# Patient Record
Sex: MALE | Race: WHITE | Employment: FULL TIME | URBAN - METROPOLITAN AREA
[De-identification: names, ages, dates, MRNs, and addresses within clinical notes are randomized per-mention and may not be internally consistent; named-entity substitution may affect disease eponyms.]

---

## 2020-08-14 ENCOUNTER — HOSPITAL ENCOUNTER (OUTPATIENT)
Dept: OPERATING ROOM | Age: 60
Setting detail: OUTPATIENT SURGERY
Discharge: HOME | End: 2020-08-14
Payer: COMMERCIAL

## 2020-08-14 VITALS — SYSTOLIC BLOOD PRESSURE: 154 MMHG | HEART RATE: 61 BPM | DIASTOLIC BLOOD PRESSURE: 96 MMHG

## 2020-08-14 PROCEDURE — 6370000000 HC RX 637 (ALT 250 FOR IP): Performed by: OPHTHALMOLOGY

## 2020-08-14 PROCEDURE — 2500000003 HC RX 250 WO HCPCS: Performed by: OPHTHALMOLOGY

## 2020-08-14 PROCEDURE — 65855 TRABECULOPLASTY LASER SURG: CPT

## 2020-08-14 RX ORDER — PROPARACAINE HYDROCHLORIDE 5 MG/ML
1 SOLUTION/ DROPS OPHTHALMIC ONCE
Status: COMPLETED | OUTPATIENT
Start: 2020-08-14 | End: 2020-08-14

## 2020-08-14 RX ORDER — PILOCARPINE HYDROCHLORIDE 20 MG/ML
1 SOLUTION/ DROPS OPHTHALMIC ONCE
Status: COMPLETED | OUTPATIENT
Start: 2020-08-14 | End: 2020-08-14

## 2020-08-14 RX ADMIN — BROMFENAC SODIUM 1 DROP: 0.7 SOLUTION/ DROPS OPHTHALMIC at 14:18

## 2020-08-14 RX ADMIN — PILOCARPINE HYDROCHLORIDE 1 DROP: 20 SOLUTION/ DROPS OPHTHALMIC at 13:03

## 2020-08-14 RX ADMIN — APRACLONIDINE HYDROCHLORIDE 1 DROP: 10 SOLUTION/ DROPS OPHTHALMIC at 14:19

## 2020-08-14 RX ADMIN — APRACLONIDINE HYDROCHLORIDE 1 DROP: 10 SOLUTION/ DROPS OPHTHALMIC at 13:03

## 2020-08-14 RX ADMIN — PROPARACAINE HYDROCHLORIDE 1 DROP: 5 SOLUTION/ DROPS OPHTHALMIC at 14:13

## 2020-08-17 NOTE — OP NOTE
OPERATIVE NOTE      Surgery Date:   08/14/2020       Pre-operative Diagnosis:   Primary Open Angle Glaucoma    Procedure:   Selective LASER Trabeculoplasty right eye    Anesthesia:  Topical    Complications:  None    The patient tolerated the procedure well and will follow up as an outpatient in my office as scheduled. Jimi Richard M.D.     Operative Note      Patient: Katelyn Lieberman  YOB: 1960  MRN: [id number]    Date of Procedure: 8/14/2020    Pre-Op Diagnosis: * No pre-op diagnosis entered *    Post-Op Diagnosis: Same       * No procedures listed *    * No surgeons listed *    Assistant:   * No surgical staff found *    Anesthesia: * No anesthesia type entered *    Estimated Blood Loss (mL): less than 50     Complications: None    Specimens:   * No specimens in log *    Implants:  * No implants in log *      Drains: * No LDAs found *    Findings: na    Detailed Description of Procedure:   Selective laser trabeculoplasty right eye    Electronically signed by Maryellen Fairbanks MD on 8/17/2020 at 12:28 PM

## 2020-08-21 ENCOUNTER — HOSPITAL ENCOUNTER (OUTPATIENT)
Dept: OPERATING ROOM | Age: 60
Setting detail: OUTPATIENT SURGERY
Discharge: HOME | End: 2020-08-21
Payer: COMMERCIAL

## 2020-08-21 VITALS — SYSTOLIC BLOOD PRESSURE: 150 MMHG | DIASTOLIC BLOOD PRESSURE: 99 MMHG

## 2020-08-21 PROCEDURE — 66821 AFTER CATARACT LASER SURGERY: CPT

## 2020-08-21 PROCEDURE — 65855 TRABECULOPLASTY LASER SURG: CPT

## 2020-08-21 PROCEDURE — 6370000000 HC RX 637 (ALT 250 FOR IP): Performed by: OPHTHALMOLOGY

## 2020-08-21 PROCEDURE — 2500000003 HC RX 250 WO HCPCS: Performed by: OPHTHALMOLOGY

## 2020-08-21 RX ORDER — PROPARACAINE HYDROCHLORIDE 5 MG/ML
1 SOLUTION/ DROPS OPHTHALMIC ONCE
Status: COMPLETED | OUTPATIENT
Start: 2020-08-21 | End: 2020-08-21

## 2020-08-21 RX ORDER — PILOCARPINE HYDROCHLORIDE 20 MG/ML
1 SOLUTION/ DROPS OPHTHALMIC ONCE
Status: COMPLETED | OUTPATIENT
Start: 2020-08-21 | End: 2020-08-21

## 2020-08-21 RX ADMIN — APRACLONIDINE HYDROCHLORIDE 1 DROP: 10 SOLUTION/ DROPS OPHTHALMIC at 13:05

## 2020-08-21 RX ADMIN — PILOCARPINE HYDROCHLORIDE 1 DROP: 20 SOLUTION/ DROPS OPHTHALMIC at 13:04

## 2020-08-21 RX ADMIN — PROPARACAINE HYDROCHLORIDE 1 DROP: 5 SOLUTION/ DROPS OPHTHALMIC at 13:52

## 2020-08-21 RX ADMIN — BROMFENAC SODIUM 1 DROP: 0.7 SOLUTION/ DROPS OPHTHALMIC at 13:55

## 2020-08-21 RX ADMIN — APRACLONIDINE HYDROCHLORIDE 1 DROP: 10 SOLUTION/ DROPS OPHTHALMIC at 13:55

## 2020-08-24 NOTE — OP NOTE
OPERATIVE NOTE      Surgery Date:  08/21/2020       Pre-operative Diagnosis:   Primary Open Angle Glaucoma    Procedure:   Selective LASER Trabeculoplasty left eye    Anesthesia:  Topical    Complications:  None    The patient tolerated the procedure well and will follow up as an outpatient in my office as scheduled. Moe Tamayo M.D.     Operative Note      Patient: Leif Crisostomo  YOB: 1960  MRN: [id number]    Date of Procedure: 8/21/2020    Pre-Op Diagnosis: * No pre-op diagnosis entered *    Post-Op Diagnosis: Same       * No procedures listed *    * No surgeons listed *    Assistant:   * No surgical staff found *    Anesthesia: * No anesthesia type entered *    Estimated Blood Loss (mL): less than 50     Complications: None    Specimens:   * No specimens in log *    Implants:  * No implants in log *      Drains: * No LDAs found *    Findings: na    Detailed Description of Procedure:   Selective laser trabeculoplsty glaucoma left eye    Electronically signed by Wai Mcdowell MD on 8/24/2020 at 8:34 AM